# Patient Record
Sex: FEMALE | Race: WHITE | NOT HISPANIC OR LATINO | Employment: PART TIME | RURAL
[De-identification: names, ages, dates, MRNs, and addresses within clinical notes are randomized per-mention and may not be internally consistent; named-entity substitution may affect disease eponyms.]

---
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
## 2021-08-03 RX ORDER — NORGESTIMATE AND ETHINYL ESTRADIOL 0.25-0.035
1 KIT ORAL DAILY
Qty: 28 EACH | Refills: 4 | Status: CANCELLED | OUTPATIENT
Start: 2021-08-03

## 2021-08-05 RX ORDER — NORGESTIMATE AND ETHINYL ESTRADIOL 0.25-0.035
1 KIT ORAL DAILY
Qty: 28 EACH | Refills: 4 | Status: CANCELLED | OUTPATIENT
Start: 2021-08-03

## 2021-08-06 RX ORDER — NORGESTIMATE AND ETHINYL ESTRADIOL 0.25-0.035
1 KIT ORAL DAILY
Qty: 28 EACH | Refills: 4 | Status: CANCELLED | OUTPATIENT
Start: 2021-08-03

## 2021-08-09 RX ORDER — NORGESTIMATE AND ETHINYL ESTRADIOL 0.25-0.035
1 KIT ORAL DAILY
Qty: 28 EACH | Refills: 4 | Status: CANCELLED | OUTPATIENT
Start: 2021-08-09

## 2021-08-09 RX ORDER — NORGESTIMATE AND ETHINYL ESTRADIOL 0.25-0.035
1 KIT ORAL DAILY
Qty: 28 EACH | Refills: 4 | Status: CANCELLED | OUTPATIENT
Start: 2021-08-03

## 2021-08-11 RX ORDER — NORGESTIMATE AND ETHINYL ESTRADIOL 0.25-0.035
1 KIT ORAL DAILY
Qty: 28 EACH | Refills: 4 | Status: CANCELLED | OUTPATIENT
Start: 2021-08-09

## 2023-05-02 NOTE — PROGRESS NOTES
"Subjective   Abeba Carr is a 19 y.o. female.   Chief Complaint   Patient presents with   • RIGHT FLANK LUMP     /87 (BP Location: Right arm, Patient Position: Sitting, Cuff Size: Adult)   Pulse 117   Temp 98.4 °F (36.9 °C) (Oral)   Ht 180.3 cm (71\")   Wt 98.3 kg (216 lb 12.8 oz)   SpO2 98%   BMI 30.24 kg/m²     HISTORY OF PRESENT ILLNESS:  19-year-old lady referred to me for evaluation of a subcutaneous mass.  She said that she first noticed it back in March.  She says that she had about 50 pounds weight loss and noticed a subcutaneous lump.  It is not necessarily changed much in size over the last 3 or 4 months but has not gone away.  It is never been red or drained.  It does cause some discomfort when she leans up against it or something bumps into it.  She has not had any other subcutaneous lesions.      Outpatient Encounter Medications as of 7/16/2020   Medication Sig Dispense Refill   • norgestimate-ethinyl estradiol (ORTHO-CYCLEN) 0.25-35 MG-MCG per tablet        No facility-administered encounter medications on file as of 7/16/2020.          The following portions of the patient's history were reviewed and updated as appropriate: allergies, current medications, past family history, past medical history, past social history, past surgical history and problem list.    Review of Systems  A complete review of systems been obtained is negative except as stated in HPI  Objective   Physical Exam   Constitutional: She appears well-developed and well-nourished.   HENT:   Head: Normocephalic and atraumatic.   Eyes: Conjunctivae are normal. No scleral icterus.   Neck: No tracheal deviation present.   Cardiovascular: Normal rate and intact distal pulses.   Pulmonary/Chest: Effort normal. No respiratory distress.   Abdominal: Soft. She exhibits no distension.   In the right flank there is approximately 3 cm soft mobile mass without any overlying skin changes mildly tender to palpation does not feel like " no edema, no murmurs, regular rate and rhythm , no edema, no murmurs, regular rate and rhythm a hernia.   Musculoskeletal: She exhibits no edema or tenderness.   Lymphadenopathy:     She has no cervical adenopathy.   Neurological: She is alert. No cranial nerve deficit.   Skin: Skin is warm and dry.   Psychiatric: She has a normal mood and affect. Her behavior is normal.         Assessment/Plan   Abeba was seen today for right flank lump.    Diagnoses and all orders for this visit:    Lipoma of abdominal wall      I have counseled her about the diagnosis of a right flank subcutaneous mass likely lipoma versus sebaceous cyst.  We talked about the natural history of this disease process the risk and benefits of surgical removal.  After our discussion she understands the risks and wishes to proceed with surgical removal.  This can likely be done under monitored sedation and local anesthetic.  She wants to discuss the timing of surgery with her mother and is going to call us for surgical scheduling.    Lloyd Schmitt MD  7/16/2020  2:10 PM    This note was created using Dragon Voice Recognition software.

## 2025-03-25 ENCOUNTER — REFERRAL TRIAGE (OUTPATIENT)
Dept: LABOR AND DELIVERY | Facility: HOSPITAL | Age: 25
End: 2025-03-25
Payer: COMMERCIAL

## 2025-03-25 ENCOUNTER — PATIENT OUTREACH (OUTPATIENT)
Dept: LABOR AND DELIVERY | Facility: HOSPITAL | Age: 25
End: 2025-03-25
Payer: COMMERCIAL

## 2025-03-25 NOTE — OUTREACH NOTE
[employer]  Enrollment    Current Estimated Gestational Age: 15w0d    Questions/Answers      Flowsheet Row Responses   Would like to participate? --  [welcome enrollment sent]              Mara Chavez RN  Maternity Nurse Navigator    3/25/2025, 18:46 EDT

## 2025-04-07 ENCOUNTER — PATIENT OUTREACH (OUTPATIENT)
Dept: LABOR AND DELIVERY | Facility: HOSPITAL | Age: 25
End: 2025-04-07
Payer: COMMERCIAL

## 2025-04-07 NOTE — OUTREACH NOTE
[employer]  Enrollment    Current Estimated Gestational Age: 16w6d    Questions/Answers      Flowsheet Row Responses   Would like to participate? No  [not sure]              Mara Chavez RN  Maternity Nurse Navigator    4/7/2025, 17:58 EDT          
No

## 2025-04-08 ENCOUNTER — PATIENT OUTREACH (OUTPATIENT)
Dept: LABOR AND DELIVERY | Facility: HOSPITAL | Age: 25
End: 2025-04-08
Payer: COMMERCIAL

## 2025-04-08 NOTE — OUTREACH NOTE
[employer]  Enrollment    Current Estimated Gestational Age: 17w0d    Questions/Answers      Flowsheet Row Responses   Would like to participate? No          Contact info provided, encouraged to call if she has any questions, concerns, or needs assistance with resources.  MONIK Chavez RN  Maternity Nurse Navigator    4/8/2025, 13:53 EDT

## 2025-07-14 ENCOUNTER — PHARMACY IMMUNIZATION REVIEW (OUTPATIENT)
Dept: PHARMACY | Facility: HOSPITAL | Age: 25
End: 2025-07-14
Payer: COMMERCIAL

## 2025-07-14 NOTE — PROGRESS NOTES
[facility] Vaccination Administration    Patient reported to [facility] via referral from [facility] on 7/14/2025.     Allergies:    Patient has no known allergies.    Vaccine History:   Immunization History   Administered Date(s) Administered    Tdap 07/14/2025       Plan:    The following vaccines were administered today:  Tdap (patient is 30 weeks pregnant)    Martha Chawla PharmD  7/14/2025  15:14 EDT

## (undated) DEVICE — GLV SURG BIOGEL LTX PF 7

## (undated) DEVICE — DECANTER: Brand: UNBRANDED

## (undated) DEVICE — UNDYED BRAIDED (POLYGLACTIN 910), SYNTHETIC ABSORBABLE SUTURE: Brand: COATED VICRYL

## (undated) DEVICE — GOWN,REINFORCE,POLY,SIRUS,BREATH SLV,XLG: Brand: MEDLINE

## (undated) DEVICE — SOL IRRIG H2O 1000ML STRL

## (undated) DEVICE — PK PROC TURNOVER

## (undated) DEVICE — SUT VIC 3/0 SH 27IN J416H

## (undated) DEVICE — UNDERGLV SURG BIOGEL INDICATOR LTX PF 7

## (undated) DEVICE — SKIN AFFIX SURG ADHESIVE 72/CS 0.55ML: Brand: MEDLINE